# Patient Record
Sex: MALE | Race: WHITE | Employment: STUDENT | URBAN - METROPOLITAN AREA
[De-identification: names, ages, dates, MRNs, and addresses within clinical notes are randomized per-mention and may not be internally consistent; named-entity substitution may affect disease eponyms.]

---

## 2024-01-13 ENCOUNTER — OFFICE VISIT (OUTPATIENT)
Age: 24
End: 2024-01-13

## 2024-01-13 VITALS
HEART RATE: 67 BPM | OXYGEN SATURATION: 96 % | DIASTOLIC BLOOD PRESSURE: 82 MMHG | WEIGHT: 204 LBS | HEIGHT: 65 IN | RESPIRATION RATE: 16 BRPM | SYSTOLIC BLOOD PRESSURE: 128 MMHG | BODY MASS INDEX: 33.99 KG/M2 | TEMPERATURE: 98.8 F

## 2024-01-13 DIAGNOSIS — H60.332 ACUTE SWIMMER'S EAR OF LEFT SIDE: Primary | ICD-10-CM

## 2024-01-13 RX ORDER — CIPROFLOXACIN AND DEXAMETHASONE 3; 1 MG/ML; MG/ML
4 SUSPENSION/ DROPS AURICULAR (OTIC) 2 TIMES DAILY
Qty: 7.5 ML | Refills: 0 | Status: SHIPPED | OUTPATIENT
Start: 2024-01-13 | End: 2024-01-20

## 2024-01-13 NOTE — PROGRESS NOTES
Isidoro Harp (:  2000) is a 23 y.o. male,New patient, here for evaluation of the following chief complaint(s):  Otalgia (Left ear clogged for the past 24hrs, last couple of days makes the jaw hurt, was in [place] a week ago was swimming a lot thinks water may still be stuck in the ear )      ASSESSMENT/PLAN:    1. Acute swimmer's ear of left side  - ciprofloxacin-dexAMETHasone (CIPRODEX) 0.3-0.1 % otic suspension; Place 4 drops into the left ear 2 times daily for 7 days  Dispense: 7.5 mL; Refill: 0  - Advised to use OTC NSAIDs on a scheduled basis for pain/discomfort. Avoid moisture in ear, no swimming during treatment duration. Wear shower cap when showering. Follow up as needed.     SUBJECTIVE/OBJECTIVE:  23 y.o. male presents with symptoms of left ear pain that radiates to left lower jaw, fullness and clogged feeling with decreased hearing x 2 days. Reports using otc ear drops yesterday and tylenol without improvement. Patient reports he traveled to [place] x 1 week ago and was often in the water swimming. Denies fevers.       Physical Exam  Vitals reviewed.   HENT:      Right Ear: Hearing, tympanic membrane, ear canal and external ear normal.      Left Ear: Tympanic membrane normal. Decreased hearing noted. Tenderness (uricle) present. There is no impacted cerumen. There is mastoid tenderness (mild). Tympanic membrane is not injected, perforated, erythematous or bulging.      Ears:      Comments: Erythema, moisture and mild swelling noted to left ear canal. TM intact.  Cardiovascular:      Rate and Rhythm: Normal rate.      Pulses: Normal pulses.   Pulmonary:      Effort: Pulmonary effort is normal.      Breath sounds: Normal breath sounds.   Psychiatric:         Behavior: Behavior is cooperative.         Vitals:    24 0906   BP: 128/82   Site: Right Upper Arm   Position: Sitting   Cuff Size: Large Adult   Pulse: 67   Resp: 16   Temp: 98.8 °F (37.1 °C)   TempSrc: Temporal   SpO2: 96%   Weight: 92.5 kg

## 2024-01-13 NOTE — PATIENT INSTRUCTIONS
- Take OTC NSAIDS (ibuprofen, motrin, aleve, advil, naproxen) 600-800mg every 6-8 hours for pain and discomfort for the next several days    - Avoid moisture to ear, do not go swimming or submerge head into water during duration of treatment. Use shower cap when showering.    - treat for one week, continue for an additional week if symptoms have not resolved. Symptoms persisting after one to two weeks warrants follow up